# Patient Record
Sex: FEMALE | Race: BLACK OR AFRICAN AMERICAN | NOT HISPANIC OR LATINO
[De-identification: names, ages, dates, MRNs, and addresses within clinical notes are randomized per-mention and may not be internally consistent; named-entity substitution may affect disease eponyms.]

---
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
## 2024-02-20 ENCOUNTER — APPOINTMENT (OUTPATIENT)
Dept: PEDIATRIC ORTHOPEDIC SURGERY | Facility: CLINIC | Age: 19
End: 2024-02-20
Payer: MEDICAID

## 2024-02-20 DIAGNOSIS — M54.6 PAIN IN THORACIC SPINE: ICD-10-CM

## 2024-02-20 PROCEDURE — 99213 OFFICE O/P EST LOW 20 MIN: CPT | Mod: 25

## 2024-02-20 PROCEDURE — 72082 X-RAY EXAM ENTIRE SPI 2/3 VW: CPT

## 2024-02-20 RX ORDER — IBUPROFEN 600 MG/1
600 TABLET, FILM COATED ORAL 3 TIMES DAILY
Qty: 90 | Refills: 5 | Status: ACTIVE | COMMUNITY
Start: 2024-02-20 | End: 1900-01-01

## 2024-02-20 RX ORDER — CYCLOBENZAPRINE HYDROCHLORIDE 5 MG/1
5 TABLET, FILM COATED ORAL 3 TIMES DAILY
Qty: 90 | Refills: 1 | Status: ACTIVE | COMMUNITY
Start: 2024-02-20 | End: 1900-01-01

## 2024-02-20 NOTE — DATA REVIEWED
[de-identified] :  My interpretation of imaging done on 2/20/2024:  AP/Lateral views of the spine show no fractures. No osseus abnormalities.    9/19/23: XR R knee obtained and independently reviewed in our office today: No evidence of any osseous abnormality, dislocation or fracture.

## 2024-02-20 NOTE — ASSESSMENT
[FreeTextEntry1] : Lennox is a 19 yo F with muscular back pain.   Overall, Lennox is doing well. Clinical findings and x-ray results were reviewed at length with the patient and parent; I believe her pain is mainly muscular in nature. At this time, I am recommending eLnnox undergo therapeutic back and neck massages and electrical stimulation 1x/week; physical therapy prescription provided. She should also apply warm compresses on her neck and shoulders while she is sitting at her desk and doing homework, to help alleviate her muscle fatigue. Prescription provided for 600 mg Tylenol and 5 mg Cyclobenzaprine for pain management. School note provided for missing class.    She will follow up in 2 months for routine follow up on her knee. This plan was discussed with family and all questions and concerns were addressed today.    Documented by Padma Cantu acting as a scribe for Dr. Warren on 02/20/2024.   The above documentation completed by the scribe is an accurate record of both my words and actions.
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
## 2024-02-20 NOTE — PHYSICAL EXAM
[FreeTextEntry1] : Overweight but otherwise healthy appearing 18 year-old child. Awake, alert, in no acute distress. Pleasant and cooperative. Eyes are clear with no sclera abnormalities. External ears, nose and mouth are clear. Good respiratory effort with no audible wheezing without use of a stethoscope.  Ambulates independently, good coordination and balance.  Back: Full range of motion at cervical, thoracic and lumbar spine with no pain or difficulty. No pain with back extension No pain with forward bend   Left ankle:  Full ROM  No pain with palpation

## 2024-02-20 NOTE — REVIEW OF SYSTEMS
[Joint Pains] : arthralgias [Change in Activity] : no change in activity [Fever Above 102] : no fever [Itching] : no itching [Redness] : no redness [Sore Throat] : no sore throat [Limping] : no limping [Joint Swelling] : no joint swelling [Seizure] : no seizures

## 2024-03-13 NOTE — ASU PATIENT PROFILE, PEDIATRIC - DOES PATIENT HAVE ADVANCE DIRECTIVE
Most Recent SHEA 7 Score - 0       SHEA 7 Score SHEA No

## 2024-04-03 NOTE — ASU PREOP CHECKLIST, PEDIATRIC - NSSDAENDDT_GEN_ALL_CORE
Patient had nurse visit for blood pressure check 04/03/2024.    At time of nurse visit, BP - 130 / 70; HR - 62, irregular.     Patient reports intermittent palpitations. Denies CP, SOB, dizziness.     Patient has scheduled appointment w / Cardiology, Dr. Lyn, 04/26/2024.    20-Oct-2021

## 2024-04-03 NOTE — ED PEDIATRIC TRIAGE NOTE - PATIENT ON (OXYGEN DELIVERY METHOD)
Neurologic Reevaluation            CHIEF COMPLAINT:  Spinal stenosis, neuropathy.    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  He previously had lumbar spine surgery; however, repeat MRI revealed spinal stenosis which required repeat lumbar surgery, which was performed on February 14th of this year.  He still has stiffness and pain in the back and says he has difficulty walking 100 yards and sit down.  He will be entering rehab this Monday.  He is 240 pounds and is trying to lose weight.  He states his A1c now is 6.2.  He is not drinking at all.  His most recent blood sugar based on his CMP performed on February 20th with 155.  In general, his blood sugars have been less than 200 in the last several weeks, was unable to find recent A1c.  Current Outpatient Medications   Medication Sig Dispense Refill    aspirin 325 MG tablet Take 325 mg by mouth daily.      bisacodyl (DULCOLAX) 10 MG suppository Place 10 mg rectally.      D-5000 125 mcg (5,000 units) tablet Take 125 mcg by mouth daily.      cyclobenzaprine (FLEXERIL) 10 MG tablet Take 10 mg by mouth 3 times daily as needed.      Saline Spray 0.65 % Solution Spray 2 sprays in each nostril.      gabapentin (NEURONTIN) 100 MG capsule TAKE 1 CAPSULE BY MOUTH IN THE MORNING AND 1 CAPSULE AT NOON AND 1 CAPSULE IN THE EVENING. 90 capsule 7    Jardiance 25 MG tablet Take 25 mg by mouth daily (before breakfast).      HYDROcodone-acetaminophen (NORCO)  MG per tablet       allopurinol (ZYLOPRIM) 300 MG tablet Take 300 mg by mouth daily.      ALPRAZolam (XANAX) 0.25 MG tablet Take 0.25 mg by mouth nightly as needed.      amLODIPine (NORVASC) 10 MG tablet Take 10 mg by mouth daily.      fluticasone (FLONASE) 50 MCG/ACT nasal spray Spray 2 sprays in each nostril daily.      FREESTYLE LITE test strip USE TO CHECK BLOOD GLUCOSE DAILY DX: E11.9       glimepiride (AMARYL) 4 MG tablet Take 4 mg by mouth in the morning and 4 mg in the evening.      meloxicam (MOBIC) 15 MG tablet Take 15 mg by mouth daily.      metFORMIN (GLUCOPHAGE) 500 MG tablet Take 1,000 mg by mouth in the morning and 1,000 mg in the evening. Take with meals.      simvastatin (ZOCOR) 10 MG tablet Take 10 mg by mouth nightly.      topiramate (TOPAMAX) 25 MG tablet Take 25 mg by mouth in the morning and 25 mg in the evening.      traMADol (ULTRAM) 50 MG tablet Take 50 mg by mouth every 8 hours as needed.      LISINOPRIL-HYDROCHLOROTHIAZIDE PO Take 1 tablet by mouth daily.       No current facility-administered medications for this visit.       PAST MEDICAL HISTORY:  Includes diabetes, neuropathy, and spinal stenosis with neurogenic claudication.    PHYSICAL EXAMINATION:  Extremely limited.  The patient was in our \[facility]\"" office.  He clearly has some pain and difficulty standing.  Reflexes and sensation and strength could not adequately be tested.    ASSESSMENT AND PLAN:  Neuropathy, multifactorial recently postop from lumbar decompressive surgery.  The patient will be entering physical therapy this coming Monday.  He has previously been in rehab for 25 days without success according to the patient's own judgment.     The neurological evaluation occurred  via live interactive with video technology with patient's consent. I was at my home office and the patient was home. I spent a total of 20 minutes on the day of the visit which included pre-charting, discussion, chart and image review and documentaion.     I would be glad to see this patient in-person within the next 6 months.  This consultation lasted 20 minutes in our \[facility]\"" office.        Dictated By:  Te Holcomb MD        D:  04/03/2024 08:57:57  T:  04/03/2024 10:36:16  MDW/maylin  Job:  [id number]      cc:   room air

## 2024-04-09 ENCOUNTER — APPOINTMENT (OUTPATIENT)
Dept: PEDIATRIC ORTHOPEDIC SURGERY | Facility: CLINIC | Age: 19
End: 2024-04-09
Payer: MEDICAID

## 2024-04-09 DIAGNOSIS — M22.8X1 OTHER DISORDERS OF PATELLA, RIGHT KNEE: ICD-10-CM

## 2024-04-09 DIAGNOSIS — M25.561 PAIN IN RIGHT KNEE: ICD-10-CM

## 2024-04-09 PROCEDURE — 99213 OFFICE O/P EST LOW 20 MIN: CPT | Mod: 25

## 2024-04-09 PROCEDURE — 73562 X-RAY EXAM OF KNEE 3: CPT | Mod: RT

## 2024-04-10 NOTE — ASSESSMENT
[FreeTextEntry1] : Lennox is a 17 yo F with right knee pain.   Overall, Lennox is doing well.   Clinical findings and x-ray results were reviewed at length with the patient and parent. X-rays of her right knee were unremarkable for any osseous abnormality, dislocation or fracture.  She continues to have medial sided knee pain.  It is near the location of the medial Blanca femoral ligament.  She does not report having any dislocation episode.  The exam is not significant for any instability.  We need to explore further to determine the cause of the medial sided knee pain.  At this time, I would like to obtain an MRI of patient's right knee to rule out soft tissue causes of her knee pain.  Our  will contact family with MRI authorization.  Follow-up will occur once the patient and their family obtain MRI results.    Documented by Padma Cantu acting as a scribe for Dr. Warren on 04/09/2024.   The above documentation completed by the scribe is an accurate record of both my words and actions.

## 2024-04-10 NOTE — PHYSICAL EXAM
[FreeTextEntry1] : Overweight but otherwise healthy appearing 18-year-old female. Awake, alert, in no acute distress. Pleasant and cooperative. Eyes are clear with no sclera abnormalities. External ears, nose and mouth are clear. Good respiratory effort with no audible wheezing without use of a stethoscope.  Ambulates independently, good coordination and balance.  Right knee: Full active and passive ROM of the knee with good muscle strength 5/5.  Neurologically intact. DTRs intact.  There is no palpable or audible clicking in the knee with ROM.  There is no edema, effusion, erythema or ecchymosis noted. There are no signs of Genu varum and valgum.  +TTP over the patella.  The knee joint is stable with varus/valgus stress. 2+ pulses palpated, with capillary refill pulse one in all toes.

## 2024-04-10 NOTE — HISTORY OF PRESENT ILLNESS
[FreeTextEntry1] : Lennox is a 18-year-old female who is s/p revision right knee arthroscopic lateral release, medial patellofemoral ligament reconstruction with tibial tubercle osteotomy due to previous irritation over her initial bioabsorbable screw site. She has been doing well since surgery.   Here today for new concern regarding right knee pain. Pt reports that she has been experiencing pain in her right knee with prolonged standing, sitting, and walking.  She states that her pain is most noticeable at night when she is in bed. In the mornings, it is significantly stiffer.  She is able to bend her knee but it is uncomfortable to do so.  She denies any recent fevers, chills or night sweats. Denies any recent trauma or injuries. She denies any radiating pain, numbness, tingling sensations, or bladder/bowel dysfunction. Here today for orthopedic evaluation of her right knee pain.

## 2024-04-10 NOTE — REASON FOR VISIT
[Follow Up] : a follow up visit [Patient] : patient [Mother] : mother [FreeTextEntry1] : Right knee pain

## 2024-04-10 NOTE — REVIEW OF SYSTEMS
[Joint Pains] : arthralgias [No Acute Changes] : No acute changes since previous visit [AM Stiffness] : am stiffness [Change in Activity] : no change in activity [Fever Above 102] : no fever [Itching] : no itching [Redness] : no redness [Sore Throat] : no sore throat [Limping] : no limping [Joint Swelling] : no joint swelling [Seizure] : no seizures

## 2024-04-10 NOTE — DATA REVIEWED
[de-identified] : 04/09/2024: XR R knee obtained and independently reviewed in our office today: No evidence of any osseous abnormality, dislocation or fracture.  My interpretation of imaging done on 2/20/2024:  AP/Lateral views of the spine show no fractures. No osseus abnormalities.    9/19/23: XR R knee obtained and independently reviewed in our office today: No evidence of any osseous abnormality, dislocation or fracture.

## 2024-05-16 ENCOUNTER — APPOINTMENT (OUTPATIENT)
Dept: PEDIATRIC ORTHOPEDIC SURGERY | Facility: CLINIC | Age: 19
End: 2024-05-16

## 2024-05-24 ENCOUNTER — OUTPATIENT (OUTPATIENT)
Dept: OUTPATIENT SERVICES | Facility: HOSPITAL | Age: 19
LOS: 1 days | End: 2024-05-24
Payer: MEDICAID

## 2024-05-24 ENCOUNTER — APPOINTMENT (OUTPATIENT)
Dept: MRI IMAGING | Facility: CLINIC | Age: 19
End: 2024-05-24
Payer: MEDICAID

## 2024-05-24 DIAGNOSIS — M22.8X1 OTHER DISORDERS OF PATELLA, RIGHT KNEE: ICD-10-CM

## 2024-05-24 DIAGNOSIS — Z98.890 OTHER SPECIFIED POSTPROCEDURAL STATES: Chronic | ICD-10-CM

## 2024-05-24 PROCEDURE — 73721 MRI JNT OF LWR EXTRE W/O DYE: CPT

## 2024-05-24 PROCEDURE — 73721 MRI JNT OF LWR EXTRE W/O DYE: CPT | Mod: 26,RT

## 2024-05-29 ENCOUNTER — APPOINTMENT (OUTPATIENT)
Dept: PEDIATRIC ORTHOPEDIC SURGERY | Facility: CLINIC | Age: 19
End: 2024-05-29

## 2024-06-04 ENCOUNTER — APPOINTMENT (OUTPATIENT)
Dept: PEDIATRIC ORTHOPEDIC SURGERY | Facility: CLINIC | Age: 19
End: 2024-06-04
Payer: MEDICAID

## 2024-06-04 PROCEDURE — 99213 OFFICE O/P EST LOW 20 MIN: CPT

## 2024-06-05 NOTE — REVIEW OF SYSTEMS
[No Acute Changes] : No acute changes since previous visit [Change in Activity] : no change in activity [Fever Above 102] : no fever [Itching] : no itching [Redness] : no redness [Sore Throat] : no sore throat [Limping] : no limping [Joint Pains] : no arthralgias [Joint Swelling] : no joint swelling [AM Stiffness] : no am stiffness [Seizure] : no seizures

## 2024-06-05 NOTE — REASON FOR VISIT
[Follow Up] : a follow up visit [Patient] : patient [Mother] : mother [FreeTextEntry1] : right knee mri results

## 2024-06-05 NOTE — HISTORY OF PRESENT ILLNESS
[FreeTextEntry1] : Lennox is a 19-year-old female who is s/p revision right knee arthroscopic lateral release, medial patellofemoral ligament reconstruction with tibial tubercle osteotomy due to previous irritation over her initial bioabsorbable screw site. She has been doing well since surgery.   Here today for follow up for her concern regarding right knee pain. At last visit on 4/9/24, Pt reported that she has been experiencing pain in her right knee with prolonged standing, sitting, and walking.  She stated that her pain is most noticeable at night when she is in bed. She was able to bend her knee with discomfort elicited. She was indicated for MRI, which she is here today to review results. Today, she states that her pain has resolved.  She denies any recent fevers, chills or night sweats. Denies any recent trauma or injuries. She denies any radiating pain, numbness, tingling sensations, or bladder/bowel dysfunction. Here today for follow up evaluation of her right knee and to review her MRI results.

## 2024-06-05 NOTE — DATA REVIEWED
[de-identified] : My review and interpretation of the radiologic studies: MRI of the right knee without contrast on 5/24/2024: IMPRESSION: Status post medial patellofemoral ligament reconstruction and tibial tubercle osteotomy. Mild increased signal about the graft without discontinuity. No fluid collection. Scarring/granulation tissue within the medial subcutaneous tissues.   04/09/2024: XR R knee obtained and independently reviewed in our office today: No evidence of any osseous abnormality, dislocation or fracture.  My interpretation of imaging done on 2/20/2024:  AP/Lateral views of the spine show no fractures. No osseus abnormalities.    9/19/23: XR R knee obtained and independently reviewed in our office today: No evidence of any osseous abnormality, dislocation or fracture.

## 2024-06-05 NOTE — ASSESSMENT
[FreeTextEntry1] : Lennox is a 17 yo F with resolving right knee pain. Status post medial patellofemoral ligament reconstruction and tibial tubercle osteotomy in May 2023.   Overall, Lennox is doing very well. Clinical findings and x-ray results were reviewed at length with the patient and parent. MRI of her right knee showed there is mild increased signal about the graft, however the medial patellofemoral ligament remains intact. She no longer complains of knee pain, and she continues to deny having any dislocation episode.  The exam is not significant for any instability.  At this time, I believe that her occasional episodes of knee pain are a result of her recent inactivity and weakness of her right knee, given that she typically experiences the pain when she is walking or sitting for prolonged periods of time. The patient is currently home for summer break from college. I have recommended that the patient begin attending physical therapy sessions to improve their ROM as well as improve strengthening about their right knee; prescription provided to family today.    Documented by Padma Cantu acting as a scribe for Dr. Warren on 06/04/2024.   The above documentation completed by the scribe is an accurate record of both my words and actions.

## 2024-06-05 NOTE — PHYSICAL EXAM
[FreeTextEntry1] : Overweight but otherwise healthy appearing 19-year-old female. Awake, alert, in no acute distress. Pleasant and cooperative. Eyes are clear with no sclera abnormalities. External ears, nose and mouth are clear. Good respiratory effort with no audible wheezing without use of a stethoscope.  Ambulates independently, good coordination and balance.  Right knee: Full active and passive ROM of the knee with good muscle strength 5/5.  Neurologically intact. DTRs intact.  There is no palpable or audible clicking in the knee with ROM.  There is no edema, effusion, erythema or ecchymosis noted.  There are no signs of Genu varum and valgum.  - TTP over the patella.  The knee joint is stable with varus/valgus stress. 2+ pulses palpated, with capillary refill pulse one in all toes.

## 2024-08-15 ENCOUNTER — APPOINTMENT (OUTPATIENT)
Dept: PEDIATRIC ORTHOPEDIC SURGERY | Facility: CLINIC | Age: 19
End: 2024-08-15

## 2024-11-26 NOTE — BRIEF OPERATIVE NOTE - ESTIMATED BLOOD LOSS
MRI NURSING NOTE:    Patient and family member (Pt's father, Ed) to MRI outpatient dept. Patient & her father informed process and plan of care during this visit.  Anesthesiologist Nasim spoke with patient & family and discussed provider's plan of care.  Consent obtained.  MRI completed without incident.  Patient tolerated diet and activities once awake, alert, and appropriate.  DC instructions discussed with Patient and  (Ed).  Questions encouraged and answered.  Defer to Provider for further instruction.  Patient discharged in stable condition to responsible adult.  
1

## 2025-01-16 NOTE — ED PEDIATRIC TRIAGE NOTE - CCCP TRG CHIEF CMPLNT
PATIENT: Leeann Shankar  DATE: 1/16/2025    Called patient regarding missed Cardiac Rehab appointments at [facility].   Spoke to patient and she reports to be out of town and will resume rehab staring next week.    Kathya Acevedo EP   
dizziness

## 2025-01-19 ENCOUNTER — EMERGENCY (EMERGENCY)
Facility: HOSPITAL | Age: 20
LOS: 1 days | Discharge: HOME | End: 2025-01-19
Attending: STUDENT IN AN ORGANIZED HEALTH CARE EDUCATION/TRAINING PROGRAM | Admitting: STUDENT IN AN ORGANIZED HEALTH CARE EDUCATION/TRAINING PROGRAM
Payer: MEDICAID

## 2025-01-19 VITALS
OXYGEN SATURATION: 99 % | DIASTOLIC BLOOD PRESSURE: 76 MMHG | SYSTOLIC BLOOD PRESSURE: 132 MMHG | HEART RATE: 77 BPM | TEMPERATURE: 98 F | RESPIRATION RATE: 18 BRPM

## 2025-01-19 VITALS
OXYGEN SATURATION: 97 % | HEART RATE: 89 BPM | RESPIRATION RATE: 18 BRPM | HEIGHT: 64 IN | TEMPERATURE: 98 F | DIASTOLIC BLOOD PRESSURE: 74 MMHG | SYSTOLIC BLOOD PRESSURE: 109 MMHG | WEIGHT: 293 LBS

## 2025-01-19 DIAGNOSIS — Z98.890 OTHER SPECIFIED POSTPROCEDURAL STATES: Chronic | ICD-10-CM

## 2025-01-19 LAB
ALBUMIN SERPL ELPH-MCNC: 4.3 G/DL — SIGNIFICANT CHANGE UP (ref 3.3–5)
ALP SERPL-CCNC: 58 U/L — SIGNIFICANT CHANGE UP (ref 40–120)
ALT FLD-CCNC: 15 U/L — SIGNIFICANT CHANGE UP (ref 4–33)
ANION GAP SERPL CALC-SCNC: 12 MMOL/L — SIGNIFICANT CHANGE UP (ref 7–14)
AST SERPL-CCNC: 17 U/L — SIGNIFICANT CHANGE UP (ref 4–32)
BASOPHILS # BLD AUTO: 0.1 K/UL — SIGNIFICANT CHANGE UP (ref 0–0.2)
BASOPHILS NFR BLD AUTO: 1 % — SIGNIFICANT CHANGE UP (ref 0–2)
BILIRUB SERPL-MCNC: 0.3 MG/DL — SIGNIFICANT CHANGE UP (ref 0.2–1.2)
BUN SERPL-MCNC: 15 MG/DL — SIGNIFICANT CHANGE UP (ref 7–23)
CALCIUM SERPL-MCNC: 9.1 MG/DL — SIGNIFICANT CHANGE UP (ref 8.4–10.5)
CHLORIDE SERPL-SCNC: 103 MMOL/L — SIGNIFICANT CHANGE UP (ref 98–107)
CO2 SERPL-SCNC: 22 MMOL/L — SIGNIFICANT CHANGE UP (ref 22–31)
CREAT SERPL-MCNC: 0.92 MG/DL — SIGNIFICANT CHANGE UP (ref 0.5–1.3)
EGFR: 91 ML/MIN/1.73M2 — SIGNIFICANT CHANGE UP
EOSINOPHIL # BLD AUTO: 0.27 K/UL — SIGNIFICANT CHANGE UP (ref 0–0.5)
EOSINOPHIL NFR BLD AUTO: 2.8 % — SIGNIFICANT CHANGE UP (ref 0–6)
GLUCOSE SERPL-MCNC: 76 MG/DL — SIGNIFICANT CHANGE UP (ref 70–99)
HCG SERPL-ACNC: <1 MIU/ML — SIGNIFICANT CHANGE UP
HCT VFR BLD CALC: 35.9 % — SIGNIFICANT CHANGE UP (ref 34.5–45)
HGB BLD-MCNC: 12.1 G/DL — SIGNIFICANT CHANGE UP (ref 11.5–15.5)
IANC: 3.58 K/UL — SIGNIFICANT CHANGE UP (ref 1.8–7.4)
IMM GRANULOCYTES NFR BLD AUTO: 0.1 % — SIGNIFICANT CHANGE UP (ref 0–0.9)
LYMPHOCYTES # BLD AUTO: 4.91 K/UL — HIGH (ref 1–3.3)
LYMPHOCYTES # BLD AUTO: 50.2 % — HIGH (ref 13–44)
MAGNESIUM SERPL-MCNC: 2 MG/DL — SIGNIFICANT CHANGE UP (ref 1.6–2.6)
MCHC RBC-ENTMCNC: 27.1 PG — SIGNIFICANT CHANGE UP (ref 27–34)
MCHC RBC-ENTMCNC: 33.7 G/DL — SIGNIFICANT CHANGE UP (ref 32–36)
MCV RBC AUTO: 80.5 FL — SIGNIFICANT CHANGE UP (ref 80–100)
MONOCYTES # BLD AUTO: 0.91 K/UL — HIGH (ref 0–0.9)
MONOCYTES NFR BLD AUTO: 9.3 % — SIGNIFICANT CHANGE UP (ref 2–14)
NEUTROPHILS # BLD AUTO: 3.58 K/UL — SIGNIFICANT CHANGE UP (ref 1.8–7.4)
NEUTROPHILS NFR BLD AUTO: 36.6 % — LOW (ref 43–77)
NRBC # BLD: 0 /100 WBCS — SIGNIFICANT CHANGE UP (ref 0–0)
NRBC # FLD: 0 K/UL — SIGNIFICANT CHANGE UP (ref 0–0)
PHOSPHATE SERPL-MCNC: 3.8 MG/DL — SIGNIFICANT CHANGE UP (ref 2.5–4.5)
PLATELET # BLD AUTO: 393 K/UL — SIGNIFICANT CHANGE UP (ref 150–400)
POTASSIUM SERPL-MCNC: 3.9 MMOL/L — SIGNIFICANT CHANGE UP (ref 3.5–5.3)
POTASSIUM SERPL-SCNC: 3.9 MMOL/L — SIGNIFICANT CHANGE UP (ref 3.5–5.3)
PROT SERPL-MCNC: 7.8 G/DL — SIGNIFICANT CHANGE UP (ref 6–8.3)
RBC # BLD: 4.46 M/UL — SIGNIFICANT CHANGE UP (ref 3.8–5.2)
RBC # FLD: 13.6 % — SIGNIFICANT CHANGE UP (ref 10.3–14.5)
SODIUM SERPL-SCNC: 137 MMOL/L — SIGNIFICANT CHANGE UP (ref 135–145)
WBC # BLD: 9.78 K/UL — SIGNIFICANT CHANGE UP (ref 3.8–10.5)
WBC # FLD AUTO: 9.78 K/UL — SIGNIFICANT CHANGE UP (ref 3.8–10.5)

## 2025-01-19 PROCEDURE — 99284 EMERGENCY DEPT VISIT MOD MDM: CPT

## 2025-01-19 PROCEDURE — 93010 ELECTROCARDIOGRAM REPORT: CPT

## 2025-01-19 RX ORDER — SODIUM CHLORIDE 9 MG/ML
1000 INJECTION, SOLUTION INTRAVENOUS ONCE
Refills: 0 | Status: COMPLETED | OUTPATIENT
Start: 2025-01-19 | End: 2025-01-19

## 2025-01-19 RX ORDER — MECLIZINE HYDROCHLORIDE 25 MG/1
12.5 TABLET ORAL ONCE
Refills: 0 | Status: DISCONTINUED | OUTPATIENT
Start: 2025-01-19 | End: 2025-01-19

## 2025-01-19 RX ORDER — MECLIZINE HYDROCHLORIDE 25 MG/1
1 TABLET ORAL
Qty: 12 | Refills: 0
Start: 2025-01-19 | End: 2025-01-21

## 2025-01-19 RX ORDER — MECLIZINE HYDROCHLORIDE 25 MG/1
25 TABLET ORAL ONCE
Refills: 0 | Status: COMPLETED | OUTPATIENT
Start: 2025-01-19 | End: 2025-01-19

## 2025-01-19 RX ADMIN — MECLIZINE HYDROCHLORIDE 25 MILLIGRAM(S): 25 TABLET ORAL at 15:36

## 2025-01-19 RX ADMIN — SODIUM CHLORIDE 1000 MILLILITER(S): 9 INJECTION, SOLUTION INTRAVENOUS at 15:27

## 2025-01-19 NOTE — ED PROVIDER NOTE - PROGRESS NOTE DETAILS
Swathi Pope MD, [facility] PGY-1: Patient re-assessed.  Vertigo improved after interventions.  Test results explained to patient including unremarkable labs. Discussed with patient results of work up, strict return precautions, and need for follow up with neurology.  Patient expressed understanding and agrees with plan.

## 2025-01-19 NOTE — ED PROVIDER NOTE - PATIENT PORTAL LINK FT
You can access the FollowMyHealth Patient Portal offered by [facility] by registering at the following website: [email or web address]. By joining [facility]’s FollowMyHealth portal, you will also be able to view your health information using other applications (apps) compatible with our system.

## 2025-01-19 NOTE — ED PROVIDER NOTE - OBJECTIVE STATEMENT
Patient is a 19 year old female with past medical history of neuromyelitis optica who presents to the emergency department for evaluation of intermittent vertigo for Patient is a 19 year old female with past medical history of neuromyelitis optica who presents to the emergency department for evaluation of intermittent vertigo for 2-3 months. Patient states the vertigo is precipitated or worsened with movement. She saw her neurologist for it and was told it was possibly due to her having high cholesterol. She also notes an episode of diarrhea yesterday and some lightheadedness today. She reports adequate oral intake. No vomiting, fevers, chills, recent illness, sick contacts, hearing changes, difficulty walking. She notes some nausea and headache associated with the vertigo.

## 2025-01-19 NOTE — ED ADULT TRIAGE NOTE - CHIEF COMPLAINT QUOTE
Patient c/o dizziness x 2-3 months. Reports room-spinning sensation. Denies chest pain, SOB, palpitations, nausea, vomiting. No neuro deficits. Phx anemia, neuromyelitis optica

## 2025-01-19 NOTE — ED PROVIDER NOTE - NSFOLLOWUPINSTRUCTIONS_ED_ALL_ED_FT
You have been evaluated in the emergency department today for dizziness. Your evaluation suggests that your symptoms are most likely due to peripheral vertigo.    You have been prescribed meclizine to help relieve your symptoms. Please take your prescription as directed. You can also try Epley Maneuvers at home to help relieve your symptoms- instructions are available online.    Please follow up with your primary care doctor in 2-3 days. Please also follow up with your neurologist as soon as you are able.    Return to the emergency department immediately for worsening or uncontrolled symptoms, worsening headache, chest pain, shortness of breath, persistent vomiting, vision changes, fainting, or for any other concerning symptoms.

## 2025-01-19 NOTE — ED PROVIDER NOTE - PHYSICAL EXAMINATION
General: WN/WD NAD  Head: Atraumatic  Eyes: EOM grossly intact, no scleral icterus  ENT: moist mucous membranes  Neurology: A&Ox3, nonfocal, MAGANA x 4; CN II-XII intact, strength 5/5 in bilateral upper and lower extremities  Respiratory: normal respiratory effort  CV: Extremities warm and well perfused  Abdominal: Soft, non-distended  Extremities: No edema  Skin: No rashes General: WN/WD NAD  Head: Atraumatic  Eyes: EOM intact, PERRLA, no scleral icterus, no conjunctival injection; 4-5 beats of fatigable left-haile gaze nystagmus; no vertical nystagmus  ENT: moist mucous membranes  Neurology: A&Ox3, nonfocal, MAGANA x 4; CN II-XII intact, strength 5/5 in bilateral upper and lower extremities; negative Romberg, no dysdiadochokinesia, normal finger to nose; normal gait, sensation intact  Respiratory: normal respiratory effort  CV: Extremities warm and well perfused  Abdominal: Soft, non-distended  Extremities: No edema  Skin: No rashes

## 2025-01-19 NOTE — ED ADULT NURSE NOTE - BEFAST LAST WELL KNOWN
· Historically documented    · Continue supportive care, regular diet  · Outpatient follow-up Unknown

## 2025-01-19 NOTE — ED ADULT TRIAGE NOTE - IDEAL BODY WEIGHT(KG)
Attempt to reach patient x2 unsuccessful, phone line gave a busy signal only.    Patient will have to be tried again.    55

## 2025-01-19 NOTE — ED PROVIDER NOTE - CLINICAL SUMMARY MEDICAL DECISION MAKING FREE TEXT BOX
Patient is a 19 year old female with past medical history of neuromyelitis optica who presents to the emergency department for evaluation of intermittent vertigo for 2-3 months. History and physical exam consistent with BPPV as cause of vertigo. Left-sided Epley performed at bedside with some relief in vertigo. Patient also lightheaded in setting of recent diarrhea, suspect some hypovolemia, will give fluids. Will also give meclizine for remaining vertigo, obtain basic labs and reassess. Likely discharge with neuro follow up.

## 2025-01-19 NOTE — ED ADULT NURSE NOTE - OBJECTIVE STATEMENT
Pt arrives to intake. Pt is A and Ox4 and ambulatory. Hx: neuromyelitis optica. Pt arrives to the ED complaining of dizziness x2-3 months. No neuro deficits noted. Pt denies abnormal vision and loss of balance. Airway is patent, respirations are even and unlabored. Pt denies chest pain, shortness of breath, headaches, numbness and tingling, fever and chills, nausea/vomitting/diarrhea. Skin is clean, dry, intact, and appropriate for race.  20 G IV in RAC. Labs sent per provider orders. Pt medicated per MAR. Plan of care ongoing, safety maintained.

## 2025-01-19 NOTE — ED ADULT NURSE NOTE - NSFALLRISKINTERV_ED_ALL_ED
Assistance OOB with selected safe patient handling equipment if applicable/Assistance with ambulation/Communicate fall risk and risk factors to all staff, patient, and family/Encourage patient to sit up slowly, dangle for a short time, stand at bedside before walking/Monitor gait and stability/Orthostatic vital signs/Provide patient with walking aids/Provide visual cue: yellow wristband, yellow gown, etc/Reinforce activity limits and safety measures with patient and family/Call bell, personal items and telephone in reach/Instruct patient to call for assistance before getting out of bed/chair/stretcher/Non-slip footwear applied when patient is off stretcher/[place] to call system/Physically safe environment - no spills, clutter or unnecessary equipment/Purposeful Proactive Rounding/Room/bathroom lighting operational, light cord in reach

## 2025-01-19 NOTE — ED PROVIDER NOTE - ATTENDING CONTRIBUTION TO CARE
Attending MD Mayfield: I have seen and examined this patient and fully participated in the care of this patient as the teaching attending. I personally made/approved the management plan and take responsibility for the patient management.     Patient is a 19-year-old female with history of neuromyelitis optica presenting to emergency department with vertigo x 3 months.  Worse with movement.  Was seen by neurology and no acute concerns.  Endorses lightheadedness today.  No systemic symptoms including fever, chills, vomiting, hearing changes, numbness, tingling.  Has not used anything for symptoms.  Patient hemodynamically stable.  Exam with clear lungs, no abdominal tenderness palpation, no murmurs, no focal neurodeficits, no dysmetria, gait normal, moving all extremities.  Epley maneuver performed at bedside with some relief.  Will get labs to evaluate for metabolic derangements, meclizine for vertigo, fluids and reassess.    *The above represents an initial assessment/impression. Please refer to progress notes for potential changes in patient clinical course*

## 2025-03-04 ENCOUNTER — NON-APPOINTMENT (OUTPATIENT)
Age: 20
End: 2025-03-04

## 2025-06-10 NOTE — ASU DISCHARGE PLAN (ADULT/PEDIATRIC) - PATIENT BELONGINGS
Physical Therapy Evaluation    Visit Type: Daily Treatment Note- Initial Evaluation  Visit: 1  Referring Provider: James Shane MD  Medical Diagnosis (from order): Z98.890 - S/P hip arthroscopy   Treatment Diagnosis: left hip - increased pain/symptoms, impaired range of motion, impaired posture, impaired muscle length/flexibility, impaired joint play/mobility, impaired mobility, impaired balance, impaired activity tolerance, increased risk for falls, impaired gait, impaired tissue mobility and impaired strength.  Onset  - Date of surgery: 5/28/2025  Patient alert and oriented X3.  Chart reviewed at time of initial evaluation (relevant co-morbidities, allergies, tests and medication listed):   Past Medical History:  No date: Anorexia nervosa (CMD)  No date: Anxiety  No date: Depression  No date: POTS (postural orthostatic tachycardia syndrome)  No date: PTSD (post-traumatic stress disorder)  - Diagnostic tests reviewed: MRI studies    SUBJECTIVE                                                                                                               Patient verbally consented to allow observer present during session. (mother, Ariana)    Patient was referred to physical therapy for left hip pain following a capsular repair and labral repair on 5/28/25 by Dr. Del Rio. Patient was seen pre-operatively for strength and range of motion maintenance but was not responding, resulting in surgical intervention. She is taking 4mg of dilaudid about twice per day and ambulate with crutches. She uses a continuous passive motion machine for an hour a day. She notes that the pain levels are already better than they were pre-operatively; also better than they were initial post-op.    [place] noting, she recently had open reduction internal fixation of the left 5th digit.     Pain / Symptoms  - Pain/symptom is: constant  - Pain rating (out of 10): Current: 5   - Location: Left groin and lateral hip pain reported; left low back  pain reported (noted prior to surgery as well).   - Quality / Description: ache, sharp     - Low back = sharp  - Alleviating Factors: ice, avoiding movement in involved area, rest, relaxation techniques, prescription medications  - Progression since onset: improved    Function:   Limitations / Exacerbation Factors:   - Patient reports pain, difficulty and increased time with function reported below.  - sleep disturbed, bed mobility, lower body dressing, grooming/hygiene/self-care activities, driving/riding in a vehicle, sitting tasks, house/yard work, work - limited or modified, bending/squatting/lifting, squatting/lifting, pushing/pulling and walking, all types of transfers  Prior Level of Function: worsening pain and function, therefore underwent surgery,    Patient Goals: decreased pain, increased motion, increased strength, return to work, improved balance and independence with ADLs/IADLs. Live independently    Prior treatment  - no therapies  - Discharged from hospital, home health, or skilled nursing facility in last 30 days: no  Home Environment   - Patient lives with: typically lives alone; living with family currently; 8 steps to bedroom + 8 to lower living room  - Type of home: multiple level home  - Bathroom setup: walk-in shower  - Assistance available: as needed  - Denies 2 or more falls or an unexplained fall with injury in the last year.  - Feel safe at home / work / school: yes    Worker's Compensation Information  Occupation Information  - Current Employer:  [employer]– [place]  - Occupation: Nurse  - : Gwendolyn Bedoya () - [phone]  - Restrictions: No more than 20# push, pull, lift  - Current Work Status: working, restricted duty due to current condition  - Full Duty Work Demands: light (10 - 20 lbs) and medium (20 - 50 lbs)  - standing >50% of the day, lifting from floor, chest height lifting, rotational/twisting motions and work below knee level    Job Related Testing  -  Job demands provided by: Client Report  Lift Floor to Waist     - Job demand: 50#, rare  Lift Waist to Chest      - Job demand: uncertain; does not do often  Lift Waist to Overhead      - Job demands: uncertain; does not do often  2 Hand Carry     - Job demands: uncertain; does not do often  Push/Pull     - Job demands: patient in wheelchair  Sustained Overhead Work     - Job demands: frequently (1/3-2/3 of the day)  Sustained Sitting    - Job demands: occasionally (up to 1/3 of the day)  Sustained Standing     - Job demands: constantly (2/3-the full day)  Sustained Kneeling    - Job demand: occasionally (up to 1/3 of the day)  Standing bent over (patient bedside care)     - Job demand: Frequently      OBJECTIVE                                                                                                                     Range of Motion (ROM)   (degrees unless noted; active unless noted; norms in ( ); negative=lacking to 0, positive=beyond 0)  Hip:   - ABDuction (40):       Left:   Passive: 10 (stiffness)   - Internal Rotation (45-50):     - in supine:         Left:   Passive: 10   - External Rotation (45-50):     - in supine:         Left:   Passive: 12  Knee:   - Flexion (150):       Left:  Flex (150) L AROM Degree: limited by pain.   - Extension (0-10):       Left:   WNL               Ambulation / Gait    Toe touch weightbearing with bilateral crutch use - patient had severe hip flexor tendonitis after her last surgery due to fear and symptoms when placing her foot on the ground; encouraged placing toe but minimal/no weightbearing through it until Physical Therapist can confirm as they are not confident with non weightbearing versus partial weightbearing status            Outcome/Assessments  Outcome Measures:   Lower Extremity Functional Scale: LEFS Calculated Total: 6 (0=extreme difficulty; 80=no difficulty) see flowsheet for additional documentation      Treatment     Therapeutic Exercise  - patient was  educated on their plan of care, goals for treatment, pain management strategies, home exercise program and relevant anatomy/imaging. All questions were answered, as able.    - home exercise program completed as listed below x 10 minutes   - education on weightbearing restrictions, ice, elevation and continued use of continuous passive motion     Skilled input: tactile instruction/cues, posture correction, facilitation, verbal instruction/cues, demonstration and as detailed above    Writer verbally educated and received verbal consent for hand placement, positioning of patient, and techniques to be performed today from patient for clothing adjustments for techniques, hand placement and palpation for techniques and therapist position for techniques as described above and how they are pertinent to the patient's plan of care.  Home Exercise Program  Access Code: [email or web address]  URL: [email or web address]  Date: 06/10/2025  Prepared by: Joy Bell    Exercises  - Supine Quad Set  - 3 x daily - 7 x weekly - 15 reps - 5 seconds hold  - Hooklying Gluteal Sets  - 3 x daily - 7 x weekly - 15 reps - 5 seconds hold  - Supine Transversus Abdominis Bracing with Pelvic Floor Contraction  - 3 x daily - 7 x weekly - 10 reps - 10 seconds hold  - Supine Ankle Pumps  - 6 x daily - 7 x weekly - 20 reps      ASSESSMENT                                                                                                          28 year old patient has reported functional limitations listed above impacted by signs and symptoms consistent with treatment diagnosis below.  Treatment Diagnosis:   - Involved: left hip.  - Symptoms/impairments: increased pain/symptoms, impaired range of motion, impaired posture, impaired muscle length/flexibility, impaired joint play/mobility, impaired mobility, impaired balance, impaired activity tolerance, increased risk for falls, impaired gait, impaired tissue mobility and impaired  strength.    Patient presents with signs & symptoms consistent with post-operative left hip pain following a left capsular repair and a labral repair (patient's third) on 5/28/25. She is now 13 days post-operative and ambulates with bilateral crutches. Pain is fairly well maintained and is improved from pre-operative status.  Pain/symptoms after session (out of 10): 4    Prognosis: Patient will benefit from skilled therapy.  Rehabilitative potential is: fair due to. Positive factors: age, motivation level and family support. Negative factors: multiple surgeries, time since onset of symptoms and response to initial treatment.  Predicted patient presentation: Moderate (evolving) - Patient comorbidities and complexities, as defined above, may have varying impact on steady progress for prescribed plan of care.  Education:   - Present and ready to learn: patient  - Results of above outlined education: Verbalizes understanding, Needs reinforcement and Demonstrates understanding    PLAN                                                                                                                         The following skilled interventions to be implemented to achieve goals listed below:  Gait Training (95882)  Neuromuscular Re-Education (76717)  Therapeutic Exercise (84981)  Ultrasound (30015)  Vasopneumatic Device (93365)  Electrical Stimulation Unattended (83546 or )  Electrical Stimulation Attended (44999)  Therapeutic Activity (54903)  Manual Therapy (97011)  Dry Needling    Frequency / Duration  2 times per week tapering as patient progresses for 14 weeks for an estimated total of 20 visits    Patient involved in and agreed to plan of care and goals.  Patient given attendance agreement at time of initial evaluation.    Suggestions for next session as indicated: Progress per plan of care.  - PROM within restrictions  - isometric strength/engagement     Goals  Long Term Goals: to be met by end of plan of care  1.  Patient will half kneel to floor for improved ease with emptying pozo catheters at work  2. Patient will push/pull a 200 pound patient in a wheelchair on a flat surface x 100 feet each for improved ease with patient care  3. Patient will report 2/10 pain or less after standing for 1 hour for improved ease with prolonged standing at work  4. Patient will complete car transfer (in and out from drivers side(s)) using least restrictive device, independently with patient reported manageable pain/symptoms of 1/10 for improved ease with driving to work  5. Patient will be independent with progressed and modified home exercise program.  6. Lower Extremity Functional Scale: Patient will score 50 or higher on The Lower Extremity Functional Scale to indicate a decreased level of difficulty with walking and moving around. (minimal clinically important difference: 9 points change)        Therapy procedure time and total treatment time can be found documented on the Time Entry flowsheet     Patient's belongings returned

## 2025-06-24 ENCOUNTER — APPOINTMENT (OUTPATIENT)
Dept: PEDIATRIC ORTHOPEDIC SURGERY | Facility: CLINIC | Age: 20
End: 2025-06-24
Payer: MEDICAID

## 2025-06-24 PROCEDURE — 72082 X-RAY EXAM ENTIRE SPI 2/3 VW: CPT

## 2025-06-24 PROCEDURE — 99214 OFFICE O/P EST MOD 30 MIN: CPT | Mod: 25

## 2025-06-24 RX ORDER — IBUPROFEN 600 MG/1
600 TABLET, FILM COATED ORAL 3 TIMES DAILY
Qty: 30 | Refills: 0 | Status: ACTIVE | COMMUNITY
Start: 2025-06-24 | End: 1900-01-01

## (undated) DEVICE — TOURNIQUET CUFF 34" DUAL PORT W PLC

## (undated) DEVICE — SUT VICRYL 1 36" CT-1 UNDYED

## (undated) DEVICE — DRSG DERMABOND 0.7ML

## (undated) DEVICE — DRAPE SURGICAL #1010

## (undated) DEVICE — SUT VICRYL 2-0 27" FS-1 UNDYED

## (undated) DEVICE — TAPE SILK 3"

## (undated) DEVICE — DRSG TELFA 2 X 3

## (undated) DEVICE — ELCTR GROUNDING PAD ADULT COVIDIEN

## (undated) DEVICE — ELCTR BOVIE TIP BLADE INSULATED 4" EDGE

## (undated) DEVICE — POSITIONER STRAP ARMBOARD VELCRO TS-30

## (undated) DEVICE — DRSG KLING 4"

## (undated) DEVICE — DRSG STERISTRIPS 0.25 X 3"

## (undated) DEVICE — TUBING DEPUY MITEK FMS OUTFLOW

## (undated) DEVICE — SOL IRR POUR NS 0.9% 1500ML

## (undated) DEVICE — TOURNIQUET CUFF 24" DUAL PORT SINGLE BLADDER W PLC (BLACK)

## (undated) DEVICE — DRSG ACE BANDAGE 6"

## (undated) DEVICE — WARMING BLANKET LOWER ADULT

## (undated) DEVICE — TOURNIQUET ESMARK 4"

## (undated) DEVICE — LABELS BLANK W PEN

## (undated) DEVICE — SYR LUER LOK 20CC

## (undated) DEVICE — Device

## (undated) DEVICE — SUT VICRYL 2-0 27" CT-2 UNDYED

## (undated) DEVICE — DRAPE TOWEL BLUE 17" X 24"

## (undated) DEVICE — SYR LUER LOK 10CC

## (undated) DEVICE — DRAPE 3/4 SHEET 52X76"

## (undated) DEVICE — PREP CHLORAPREP SWABSTICK 5.25ML

## (undated) DEVICE — SUT VICRYL 2-0 27" SH UNDYED

## (undated) DEVICE — DRAPE U POLY BLUE 60"X60"

## (undated) DEVICE — DRAPE INSTRUMENT POUCH 6.75" X 11"

## (undated) DEVICE — DRAPE BACK TABLE COVER 44X90"

## (undated) DEVICE — GLV 8 PROTEXIS (WHITE)

## (undated) DEVICE — DRSG TEGADERM 1.75X1.75"

## (undated) DEVICE — DRILL BIT SYNTHES ORTHO QC 2.5X110MM

## (undated) DEVICE — SUT MONOCRYL 3-0 27" PS-2 UNDYED

## (undated) DEVICE — DRSG WEBRIL 3"

## (undated) DEVICE — SOL IRR POUR NS 0.9% 500ML

## (undated) DEVICE — WARMING BLANKET UPPER ADULT

## (undated) DEVICE — LIJ-ARTHREX BIOTENODESIS TRAY: Type: DURABLE MEDICAL EQUIPMENT

## (undated) DEVICE — LIJ-K WIRE TRAY (REQUIRES BILL) (IMPLANT): Type: DURABLE MEDICAL EQUIPMENT

## (undated) DEVICE — PREP CHLORAPREP HI-LITE ORANGE 26ML

## (undated) DEVICE — VENODYNE/SCD SLEEVE CALF PEDS

## (undated) DEVICE — DRSG XEROFORM 1 X 8"

## (undated) DEVICE — TUBING S&N OUTFLOW

## (undated) DEVICE — DRSG WEBRIL 4"

## (undated) DEVICE — PACK KNEE ARTHROSCOPY

## (undated) DEVICE — CANISTER DISPOSABLE THIN WALL 3000CC

## (undated) DEVICE — DRAPE STERI-DRAPE INCISE 23X17"

## (undated) DEVICE — DRSG ACE BANDAGE 4" NS

## (undated) DEVICE — DRAPE IOBAN 33" X 23"

## (undated) DEVICE — FRAZIER SUCTION TIP 8FR

## (undated) DEVICE — DRSG STOCKINETTE IMPERVIOUS XL

## (undated) DEVICE — NDL HYPO SAFE 22G X 1.5" (BLACK)

## (undated) DEVICE — DRAPE COVER SNAP 36X30"

## (undated) DEVICE — SOL IRR POUR H2O 1500ML

## (undated) DEVICE — SOL IRR BAG NS 0.9% 3000ML

## (undated) DEVICE — DRSG COBAN 4"

## (undated) DEVICE — POSITIONER PATIENT SAFETY STRAP 3X60"

## (undated) DEVICE — SUT VICRYL 4-0 27" PS-2 UNDYED

## (undated) DEVICE — TUBING DEPUY MITEK FMS INFLOW

## (undated) DEVICE — PACK LIJ BASIC ORTHO

## (undated) DEVICE — DRSG STERISTRIPS 0.5 X 4"

## (undated) DEVICE — DRSG CURITY GAUZE SPONGE 4 X 4" 12-PLY

## (undated) DEVICE — ELCTR BOVIE TIP BLADE INSULATED 2.75" EDGE

## (undated) DEVICE — SUT VICRYL 3-0 27" RB-1 UNDYED

## (undated) DEVICE — NDL HYPO REGULAR BEVEL 25G X 1.5" (BLUE)

## (undated) DEVICE — NEPTUNE II 4-PORT MANIFOLD

## (undated) DEVICE — TUBING S&N INFLOW

## (undated) DEVICE — SUT VICRYL 4-0 27" RB-1 UNDYED

## (undated) DEVICE — SUT VICRYL 0 27" CT-1 UNDYED

## (undated) DEVICE — SUT MONOCRYL 4-0 27" PS-2 UNDYED

## (undated) DEVICE — SYR LUER LOK 50CC

## (undated) DEVICE — LIJ-SYNTHES LOCKING SMALL FRAGMENT (REQUIRES BILL) (IMPLANT): Type: DURABLE MEDICAL EQUIPMENT

## (undated) DEVICE — SHAVER BLADE S&N SYNOVATOR 4.5MM CURVED (FOREST GREEN)

## (undated) DEVICE — DVC SUCTION FLR PUDDLE GUPPY

## (undated) DEVICE — TOURNIQUET ESMARK 6"

## (undated) DEVICE — LIJ/LIA-TOURNIQUET #2 4014EABF: Type: DURABLE MEDICAL EQUIPMENT

## (undated) DEVICE — PREP SCRUB BRUSH W CHG 4%

## (undated) DEVICE — SHAVER BLADE S&N FULL RADIUS 3.5MM STRAIGHT (BEIGE)

## (undated) DEVICE — SUT ETHILON 3-0 18" FS-1

## (undated) DEVICE — SUT NDL MAYO CATGUT 1/2 CIRCLE TAPER POINT 0.050" X 1.092"

## (undated) DEVICE — SUT WIRE # 2 TIGERLOOP